# Patient Record
Sex: MALE | Race: WHITE | NOT HISPANIC OR LATINO | URBAN - METROPOLITAN AREA
[De-identification: names, ages, dates, MRNs, and addresses within clinical notes are randomized per-mention and may not be internally consistent; named-entity substitution may affect disease eponyms.]

---

## 2017-12-17 ENCOUNTER — OFFICE VISIT - HEALTHEAST (OUTPATIENT)
Dept: FAMILY MEDICINE | Facility: CLINIC | Age: 52
End: 2017-12-17

## 2017-12-17 DIAGNOSIS — T14.8XXA MUSCLE STRAIN: ICD-10-CM

## 2021-05-31 VITALS — WEIGHT: 205.19 LBS | BODY MASS INDEX: 29.44 KG/M2

## 2021-06-14 NOTE — PROGRESS NOTES
Assessment:      Muscle strain     Plan:      Muscle relaxer as prescribed   Ice/heat, rest, light stretching  NSAIDs  Discussed signs of emergent symptoms and when to follow-up with PCP if no symptom improvement.    Patient Instructions   You were seen today for a muscle strain.    Symptom management:  - Take the flexeril to relax the muscle, start with just 5 mg, if you tolerate that alright may take up to 10mg at a time up to 3 times a day as needed.  - May use Tylenol for first 3 hours, then ibuprofen 400-600mg at a time for the next 3 hours, and alternate  - Ice/heat pads as tolerated  - Rest with occasional light stretching  - May use robitussin to help suppress the cough    Reasons to be seen immediately in the emergency room:  - Develop numbness or tingling in the groin or legs  - Sharp shooting pain down to your legs  - Loss of bowel or bladder function    Otherwise, if pain is not improving after 5 days, return for re-evaluation or see your primary care provider.         Subjective:      Eyad Brian is a 52 y.o. male who presents for evaluation of low back pain. Onset of symptoms was 1 week ago with acute worsening this morning. Patient has had an URI with coughing. While he was going for a walk today he developed a coughing fit and felt his lower back twinge and developed sharp pain that is worse during coughing. Had slight pain in the back before this, but much worse with cough. Pain radiates to the right flank. Denies fever, nausea, vomiting, and abdominal pain. No numbness/tingling, shooting pain in the legs, or muscle weakness. No loss of bowel or bladder function.    The following portions of the patient's history were reviewed and updated as appropriate: allergies, current medications, past family history and problem list.    Review of Systems  Pertinent items are noted in HPI.    Allergies  No Known Allergies     Objective:      BP (!) 140/96 (Patient Site: Left Arm, Patient Position: Sitting,  Cuff Size: Adult Large)  Pulse 83  Temp 97.6  F (36.4  C) (Oral)   Resp 20  Wt 205 lb 3 oz (93.1 kg)  SpO2 99%  General appearance: alert, appears stated age, cooperative, mild distress and non-toxic  Head: Normocephalic, without obvious abnormality, atraumatic  Back: no midline tenderness, no tenderness over the paraspinal muscles, no swelling, erythema, or ecchymosis  Abdomen: soft, non-tender, no masses, no organomegaly, no tenderness over McBurney's point  Extremities: FROM without pain, negative straight leg raise, motor strength intact

## 2021-11-11 ENCOUNTER — APPOINTMENT (OUTPATIENT)
Dept: URBAN - METROPOLITAN AREA CLINIC 260 | Age: 56
Setting detail: DERMATOLOGY
End: 2021-11-13

## 2021-11-11 VITALS — HEIGHT: 70 IN | RESPIRATION RATE: 18 BRPM | WEIGHT: 210 LBS

## 2021-11-11 DIAGNOSIS — L82.1 OTHER SEBORRHEIC KERATOSIS: ICD-10-CM

## 2021-11-11 PROCEDURE — OTHER COUNSELING: OTHER

## 2021-11-11 PROCEDURE — OTHER LIQUID NITROGEN: OTHER

## 2021-11-11 ASSESSMENT — LOCATION SIMPLE DESCRIPTION DERM: LOCATION SIMPLE: LEFT NOSE

## 2021-11-11 ASSESSMENT — LOCATION ZONE DERM: LOCATION ZONE: NOSE

## 2021-11-11 ASSESSMENT — LOCATION DETAILED DESCRIPTION DERM: LOCATION DETAILED: LEFT NASAL SIDEWALL

## 2021-11-11 NOTE — PROCEDURE: LIQUID NITROGEN
Bill Insurance (You Assume Risk Of Denial Or Audit By Selecting Yes): No
Consent: The patient's consent was obtained including but not limited to risks of crusting, scabbing, blistering, scarring, darker or lighter pigmentary change, recurrence, incomplete removal and infection.
Medical Necessity Clause: This procedure was medically necessary because the lesions that were treated were:
Medical Necessity Information: It is in your best interest to select a reason for this procedure from the list below. All of these items fulfill various CMS LCD requirements except the new and changing color options.
Post-Care Instructions: I reviewed with the patient in detail post-care instructions. Patient is to wear sunprotection, and avoid picking at any of the treated lesions. Pt may apply Vaseline to crusted or scabbing areas.
Total Number Of Lesions Treated: 5
Detail Level: Zone